# Patient Record
(demographics unavailable — no encounter records)

---

## 2025-05-14 NOTE — ASSESSMENT
[FreeTextEntry1] : The patient is a 62-year-old female with multiple gallstones and right upper abdominal pain.  The patient has been advised that she will benefit from a cholecystectomy.  The risks, benefits, and alternatives including the option of doing nothing to a robotic, possible laparoscopic, and possible open cholecystectomy were discussed.  The potential complications including but not limited to infection, bleeding, bile leak, bowel injury and/or obstruction, chronic post op or recurrent abdominal pain, and possible bile duct injury were discussed.  The patient also understands that post op dietary tolerances and bowel movements may also be affected and different from prior to surgery.  The patient understands and wishes to proceed at the next available time.  [facility]  was used for this encounter.  A total of 40 minutes was spent coordinating the patient's care.

## 2025-05-14 NOTE — HISTORY OF PRESENT ILLNESS
[de-identified] : The patient comes to the office in consultation by Dr. Jameson Reece for evaluation of upper right abdominal pain. She states that the pain is present on a daily basis but is made worse with eating. She has been suffering with the pain for close to two years.  She has pain that is sharp crampy pain at times that starts in the right upper abdominal area that radiates to the epigastric area.  She is scheduled for an EGD.

## 2025-05-14 NOTE — CONSULT LETTER
[Dear  ___] : Dear  [unfilled], [Consult Letter:] : I had the pleasure of evaluating your patient, [unfilled]. [Please see my note below.] : Please see my note below. [Consult Closing:] : Thank you very much for allowing me to participate in the care of this patient.  If you have any questions, please do not hesitate to contact me. [Sincerely,] : Sincerely, [FreeTextEntry3] : Marv Horvath MD, FACS   Department of Surgery [facility]

## 2025-05-14 NOTE — PHYSICAL EXAM
[JVD] : no jugular venous distention  [Purpura] : no purpura  [Petechiae] : no petechiae [Skin Ulcer] : no ulcer [Skin Induration] : no induration [Alert] : alert [Oriented to Person] : oriented to person [Oriented to Place] : oriented to place [Oriented to Time] : oriented to time [Calm] : calm [de-identified] : nontoxic, in no acute distress [de-identified] : NC/AT PERRL EOMI no scleral icterus  [de-identified] : trachea midline  [de-identified] : no audible wheezing or stridor  [de-identified] : soft, tender to deep palpation of the right upper abdomen, no guarding, no rebound, no masses  [de-identified] : FROM of the extremities with no gross angulation or deformity, no abdominal wall herniation   [de-identified] : mood is calm

## 2025-06-03 NOTE — REASON FOR VISIT
[Follow-up] : a follow-up of an existing diagnosis [FreeTextEntry1] : hx of HP- treatment ordered 5/19- alverto almodovar. RUQ, epigastric pain

## 2025-06-03 NOTE — ASSESSMENT
[FreeTextEntry1] : A/p hx of HP- treatment ordered 5/19- alverto almodovar. RUQ, epigastric pain   I discussed the risks and benefits of EGD and patient was given opportunity to ask questions. EGD to r/o Barrera's  esophagus, PUD, mass, AVM'S. Pt is medically optimized for EGD

## 2025-06-10 NOTE — REASON FOR VISIT
[FreeTextEntry1] : Presenting to establish cardiovascular care and for preoperative cardiovascular risk assessment

## 2025-06-10 NOTE — PHYSICAL EXAM
[Well Developed] : well developed [Well Nourished] : well nourished [No Acute Distress] : no acute distress [Normal Conjunctiva] : normal conjunctiva [Normal Venous Pressure] : normal venous pressure [No Carotid Bruit] : no carotid bruit [Clear Lung Fields] : clear lung fields [Good Air Entry] : good air entry [No Respiratory Distress] : no respiratory distress  [Soft] : abdomen soft [Non Tender] : non-tender [Normal Gait] : normal gait [No Edema] : no edema [Moves all extremities] : moves all extremities [No Focal Deficits] : no focal deficits [Normal Speech] : normal speech [Alert and Oriented] : alert and oriented [Normal memory] : normal memory [de-identified] : RRR, S1 and S2, no mrg [de-identified] : 2+ distal pulses

## 2025-06-10 NOTE — REVIEW OF SYSTEMS
[Negative] : Psychiatric [FreeTextEntry2] : As per HPI [FreeTextEntry5] : As per HPI [FreeTextEntry6] : As per HPI [FreeTextEntry9] : As per HPI

## 2025-06-10 NOTE — HISTORY OF PRESENT ILLNESS
[FreeTextEntry1] : Patient is a Lithuanian-speaking 62 year old female with PMH of H pylori s/p treatment, cholelithiasis, DM2, HLD hx of hysterectomy, new Dx of HTN and DM2 (A1c 9.2%) who presents to establish cardiovascular care and for preoperative cardiovascular risk assessment. Patient is accompanied by her son.  Patient has no acute complaints, reports that she is tentatively scheduled for elective cholecystectomy in 3 months.  Patient also states that she has been having some dyspnea and chest tightness with moderate exertion, which resolves with rest.  In addition, she states that her ambulation has been limited by bilateral calf pain with resolves with rest, consistent with [employer] II symptoms.  Patient is not currently on hypertensive medications and has recently been started on Ozempic and Jentadueto for DM2.   ECG obtained today with NSR  Prior Studies: Labs 6/5/25: BUN/Cr 19.6/0.98, K 4.7, AST/ALT 19/19; H/H 11.9/35.8, PLTS 267, A1c 9.2%

## 2025-06-10 NOTE — ASSESSMENT
[FreeTextEntry1] : 62 year old female with PMH of H pylori s/p treatment, cholelithiasis, DM2, HLD hx of hysterectomy, new Dx of HTN and DM2 (A1c 9.2%) who presents to establish cardiovascular care and for preoperative cardiovascular risk assessment.  Patient complains of exertional symptoms of dyspnea and chest discomfort with typical and atypical features, in addition to b/l exertional calf pain c/w [place] II claudication.  BP elevated at 132/90mmHg on visit today, not currently on BP regimen. Will start losartan 25mg qD, recheck BMP in 2 weeks to monitor Cr.  Based on patient's exertional symptoms, recommend additional preoperative cardiovascular evaluation prior to proceeding with elective cholecystectomy.  Patient is unable to exercise due to claudication symptoms, will plan for pharmacologic nuclear stress test and TTE for further evaluation.  Will obtain RONALD to evaluate claudication.

## 2025-06-10 NOTE — DISCUSSION/SUMMARY
[FreeTextEntry1] : 1) Preoperative cardiovascular evaluation -Recommend additional preoperative cardiovascular evaluation prior to proceeding with elective cholecystectomy - Pharm stress test as patient is unable to exercise due to LE pain c/w claudication - TTE  2) Exertional dyspena/chest tightness - Evaluation as above  3) Claudication - Exertional LE pain bilaterally relieved with rest - ABIs  4) HTN - Elevated BP today at 132/90 - Start losartan 25mg qD - Obtain repeat BMP in 2 weeks  5) HLD - Cardiac evaluation as above - Lipid Panel pending - Consider starting statin on RTC in setting of DM2, pending results of lipid panel  6) DM2 - A1c 9.2% - Continue Ozempic and Jentadueto per PCP [EKG obtained to assist in diagnosis and management of assessed problem(s)] : EKG obtained to assist in diagnosis and management of assessed problem(s)